# Patient Record
Sex: FEMALE | Race: BLACK OR AFRICAN AMERICAN | NOT HISPANIC OR LATINO | Employment: FULL TIME | URBAN - METROPOLITAN AREA
[De-identification: names, ages, dates, MRNs, and addresses within clinical notes are randomized per-mention and may not be internally consistent; named-entity substitution may affect disease eponyms.]

---

## 2021-04-08 DIAGNOSIS — Z23 ENCOUNTER FOR IMMUNIZATION: ICD-10-CM

## 2023-09-21 ENCOUNTER — APPOINTMENT (OUTPATIENT)
Dept: RADIOLOGY | Facility: CLINIC | Age: 58
End: 2023-09-21
Payer: COMMERCIAL

## 2023-09-21 ENCOUNTER — OFFICE VISIT (OUTPATIENT)
Dept: OBGYN CLINIC | Facility: CLINIC | Age: 58
End: 2023-09-21
Payer: COMMERCIAL

## 2023-09-21 ENCOUNTER — TELEPHONE (OUTPATIENT)
Age: 58
End: 2023-09-21

## 2023-09-21 VITALS
HEART RATE: 92 BPM | SYSTOLIC BLOOD PRESSURE: 173 MMHG | OXYGEN SATURATION: 99 % | BODY MASS INDEX: 30.13 KG/M2 | DIASTOLIC BLOOD PRESSURE: 90 MMHG | HEIGHT: 67 IN | WEIGHT: 192 LBS

## 2023-09-21 DIAGNOSIS — M25.512 ACUTE PAIN OF LEFT SHOULDER: Primary | ICD-10-CM

## 2023-09-21 DIAGNOSIS — M25.512 ACUTE PAIN OF LEFT SHOULDER: ICD-10-CM

## 2023-09-21 PROCEDURE — 99204 OFFICE O/P NEW MOD 45 MIN: CPT | Performed by: FAMILY MEDICINE

## 2023-09-21 PROCEDURE — 73030 X-RAY EXAM OF SHOULDER: CPT

## 2023-09-21 RX ORDER — TRIAMTERENE AND HYDROCHLOROTHIAZIDE 37.5; 25 MG/1; MG/1
CAPSULE ORAL
COMMUNITY
Start: 2023-08-26

## 2023-09-21 RX ORDER — NAPROXEN 500 MG/1
500 TABLET ORAL 2 TIMES DAILY WITH MEALS
Qty: 60 TABLET | Refills: 0 | Status: SHIPPED | OUTPATIENT
Start: 2023-09-21

## 2023-09-21 RX ORDER — AMLODIPINE BESYLATE 5 MG/1
TABLET ORAL
COMMUNITY
Start: 2023-08-31

## 2023-09-21 NOTE — TELEPHONE ENCOUNTER
Mounika Vega found out that prior Yaa Blowers is not required but still needs the MRI script to be faxed to her.     Thank you

## 2023-09-21 NOTE — PROGRESS NOTES
Subjective:  Chief Complaint   Patient presents with   • Left Shoulder - Pain       Army Srivastava is a 63 y.o. female presenting for initial evaluation of r left shoulder pain. Patient states that the symptoms began approximately 2 weeks ago without any inciting injury. She does report having COVID infection in July. She is active daily and plays with her grandchildren. States that she is having pain reproduction and weakness predominantly with lifting overhead. Occasionally does have radiation of pain into the forearm. She denies any numbness or tingling. The following portions of the patient's history were reviewed and updated as appropriate: allergies, current medications, past family history, past medical history, past social history, past surgical history and problem list.    Occupation:      . Objective:  BP (!) 173/90   Pulse 92   Ht 5' 7" (1.702 m)   Wt 87.1 kg (192 lb)   SpO2 99%   BMI 30.07 kg/m²     Skin: no rashes, lesions, skin discolorations, lacerations  Vasculature: normal radial and ulnar pulse,  normal skin color, normal capillary refill in extremity, no upper extremity edema  Neurologic: Neurologic exam is normal throughout upper extremities, Awake, alert, and oriented x3, no apparent distress. Musculoskeletal:   left SHOULDER EXAM    Intact skin. No erythema, no induration, no signs of infection  No gross deformity    AROM FF: 100 degrees  AROM ER with arm at its side: 90 degrees  AROM IR: mid thoracic    Grind test: negative    Supraspinatus strength testing:3/5  Drop arm positive  Infraspinatus strength testin/5    Belly press: negative  Bear Hug test: negative    Empty can: could not be assessed  hawkin mildly positive  neer negative          Tenderness to palpation of AC joint: negative  Pain with cross-body adduction: negative          Imaging:    No results found. Assessment/Plan:  1.  Acute pain of left shoulder  X-rays were obtained in office today and reviewed independently. No acute fractures or dislocations noted. Follow-up official radiology report. Patient exhibits notable weakness with supraspinatus testing and positive drop arm test.  Given examination findings concern for possible rotator cuff injury however unclear given no history of recent trauma? Recommending MRI for further evaluation-patient reports she is unable to tolerate MRI studies, we will proceed with CT arthrogram of the left shoulder. She will return once results are received. Continue with naproxen 500 mg twice daily for pain relief. Alternative consideration includes subacromial bursitis versus cervical radiculopathy. She does not exhibit any positive impingement signs and negative Spurling's test.  No neurologic deficits on exam.  Return precautions provided  - XR shoulder 2+ vw left; Future  - CT shoulder left arthrogram; Future  - naproxen (Naprosyn) 500 mg tablet; Take 1 tablet (500 mg total) by mouth 2 (two) times a day with meals  Dispense: 60 tablet; Refill: 0  - FL injection left shoulder (arthrogram);  Future

## 2023-09-21 NOTE — TELEPHONE ENCOUNTER
Caller: Lawrence Gill from [facility]     Doctor: Martha Ferrer    Reason for call: calling for MRI script and prior auth, patient can possible be seen tomorrow 9/22 @745 am    Call back#: [phone]
